# Patient Record
Sex: FEMALE | Race: WHITE
[De-identification: names, ages, dates, MRNs, and addresses within clinical notes are randomized per-mention and may not be internally consistent; named-entity substitution may affect disease eponyms.]

---

## 2017-12-13 ENCOUNTER — HOSPITAL ENCOUNTER (OUTPATIENT)
Dept: HOSPITAL 6 - MAMMO | Age: 42
End: 2017-12-13
Attending: PHYSICIAN ASSISTANT
Payer: COMMERCIAL

## 2017-12-13 DIAGNOSIS — Z12.31: Primary | ICD-10-CM

## 2017-12-13 PROCEDURE — G0202 SCR MAMMO BI INCL CAD: HCPCS

## 2019-04-02 ENCOUNTER — HOSPITAL ENCOUNTER (OUTPATIENT)
Dept: HOSPITAL 6 - MAMMO | Age: 44
End: 2019-04-02
Attending: FAMILY MEDICINE
Payer: COMMERCIAL

## 2019-04-02 DIAGNOSIS — Z12.31: Primary | ICD-10-CM

## 2022-01-26 ENCOUNTER — HOSPITAL ENCOUNTER (OUTPATIENT)
Dept: HOSPITAL 6 - MAMMO | Age: 47
End: 2022-01-26
Attending: FAMILY MEDICINE
Payer: COMMERCIAL

## 2022-01-26 DIAGNOSIS — Z12.31: Primary | ICD-10-CM

## 2022-01-26 DIAGNOSIS — N64.89: ICD-10-CM

## 2022-01-31 ENCOUNTER — HOSPITAL ENCOUNTER (OUTPATIENT)
Dept: HOSPITAL 6 - MAMMO | Age: 47
End: 2022-01-31
Attending: FAMILY MEDICINE
Payer: COMMERCIAL

## 2022-01-31 DIAGNOSIS — R92.8: Primary | ICD-10-CM

## 2023-01-27 ENCOUNTER — HOSPITAL ENCOUNTER (OUTPATIENT)
Dept: HOSPITAL 19 - SDCO | Age: 48
Discharge: HOME | End: 2023-01-27
Attending: INTERNAL MEDICINE
Payer: COMMERCIAL

## 2023-01-27 VITALS — SYSTOLIC BLOOD PRESSURE: 116 MMHG | DIASTOLIC BLOOD PRESSURE: 77 MMHG | HEART RATE: 72 BPM | TEMPERATURE: 98 F

## 2023-01-27 VITALS — HEART RATE: 68 BPM | DIASTOLIC BLOOD PRESSURE: 79 MMHG | SYSTOLIC BLOOD PRESSURE: 124 MMHG

## 2023-01-27 VITALS — SYSTOLIC BLOOD PRESSURE: 126 MMHG | HEART RATE: 56 BPM | DIASTOLIC BLOOD PRESSURE: 67 MMHG

## 2023-01-27 DIAGNOSIS — D12.3: ICD-10-CM

## 2023-01-27 DIAGNOSIS — Z12.11: Primary | ICD-10-CM

## 2023-01-27 NOTE — NUR
1005  RETURNS TO ROOM 9 PER CART.  AWAKE, ALERT.  AMBULATES TO RECLINER WITH
STANDBY ASSIST.  RESP CLEAR, UNLABORED.  DENIES NAUSEA OR ABD PAIN.  VITAL
SIGNS OBTAINED.  CALL LIGHT AT SIDE.   IN ROOM
1016 DISCHARGE INSTRUCTIONS REVIEWED.  PATIENT VERBALIZES UNDERSTANDING.  COPY
PROVIDED IN DISCHARGE FOLDER.
1020  TOLERATES PO WATER WITHOUT NAUSEA.  REFUSES SNACK.
1035 DR SIMENTAL HERE TO VISIT WITH PATIENT.
1035 DRESSES SELF

## 2023-02-01 ENCOUNTER — HOSPITAL ENCOUNTER (OUTPATIENT)
Dept: HOSPITAL 6 - MAMMO | Age: 48
End: 2023-02-01
Attending: FAMILY MEDICINE
Payer: COMMERCIAL

## 2023-02-01 DIAGNOSIS — Z12.31: Primary | ICD-10-CM
